# Patient Record
Sex: MALE | Race: OTHER | URBAN - METROPOLITAN AREA
[De-identification: names, ages, dates, MRNs, and addresses within clinical notes are randomized per-mention and may not be internally consistent; named-entity substitution may affect disease eponyms.]

---

## 2017-04-26 ENCOUNTER — EMERGENCY (EMERGENCY)
Facility: HOSPITAL | Age: 22
LOS: 1 days | Discharge: HOME | End: 2017-04-26
Attending: EMERGENCY MEDICINE | Admitting: EMERGENCY MEDICINE
Payer: MEDICAID

## 2017-04-26 VITALS
DIASTOLIC BLOOD PRESSURE: 85 MMHG | OXYGEN SATURATION: 99 % | WEIGHT: 195.11 LBS | SYSTOLIC BLOOD PRESSURE: 135 MMHG | HEART RATE: 73 BPM | HEIGHT: 71 IN | RESPIRATION RATE: 18 BRPM | TEMPERATURE: 98 F

## 2017-04-26 DIAGNOSIS — S50.11XA CONTUSION OF RIGHT FOREARM, INITIAL ENCOUNTER: ICD-10-CM

## 2017-04-26 DIAGNOSIS — M54.2 CERVICALGIA: ICD-10-CM

## 2017-04-26 DIAGNOSIS — S50.01XA CONTUSION OF RIGHT ELBOW, INITIAL ENCOUNTER: ICD-10-CM

## 2017-04-26 DIAGNOSIS — M79.601 PAIN IN RIGHT ARM: ICD-10-CM

## 2017-04-26 PROCEDURE — 73030 X-RAY EXAM OF SHOULDER: CPT | Mod: 26,RT

## 2017-04-26 PROCEDURE — 99284 EMERGENCY DEPT VISIT MOD MDM: CPT | Mod: 25

## 2017-04-27 VITALS
HEART RATE: 56 BPM | RESPIRATION RATE: 18 BRPM | SYSTOLIC BLOOD PRESSURE: 120 MMHG | OXYGEN SATURATION: 98 % | DIASTOLIC BLOOD PRESSURE: 60 MMHG | TEMPERATURE: 98 F

## 2017-04-27 PROCEDURE — 73070 X-RAY EXAM OF ELBOW: CPT | Mod: 26,RT

## 2017-04-27 PROCEDURE — 73030 X-RAY EXAM OF SHOULDER: CPT | Mod: 26,RT

## 2017-04-27 PROCEDURE — 73090 X-RAY EXAM OF FOREARM: CPT | Mod: 26,RT

## 2017-04-27 RX ORDER — IBUPROFEN 200 MG
1 TABLET ORAL
Qty: 28 | Refills: 0 | OUTPATIENT
Start: 2017-04-27 | End: 2017-05-04

## 2017-04-27 RX ORDER — IBUPROFEN 200 MG
600 TABLET ORAL ONCE
Qty: 0 | Refills: 0 | Status: COMPLETED | OUTPATIENT
Start: 2017-04-27 | End: 2017-04-27

## 2017-04-27 RX ORDER — OXYCODONE HYDROCHLORIDE 5 MG/1
1 TABLET ORAL
Qty: 16 | Refills: 0 | OUTPATIENT
Start: 2017-04-27 | End: 2017-05-01

## 2017-04-27 RX ORDER — CYCLOBENZAPRINE HYDROCHLORIDE 10 MG/1
1 TABLET, FILM COATED ORAL
Qty: 15 | Refills: 0 | OUTPATIENT
Start: 2017-04-27 | End: 2017-05-02

## 2017-04-27 RX ORDER — CYCLOBENZAPRINE HYDROCHLORIDE 10 MG/1
10 TABLET, FILM COATED ORAL ONCE
Qty: 0 | Refills: 0 | Status: COMPLETED | OUTPATIENT
Start: 2017-04-27 | End: 2017-04-27

## 2017-04-27 RX ADMIN — CYCLOBENZAPRINE HYDROCHLORIDE 10 MILLIGRAM(S): 10 TABLET, FILM COATED ORAL at 01:45

## 2017-04-27 RX ADMIN — Medication 600 MILLIGRAM(S): at 01:46
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
## 2017-04-27 NOTE — ED PROVIDER NOTE - CARE PLAN
Principal Discharge DX:	Contusion of right elbow, initial encounter  Secondary Diagnosis:	Contusion of right forearm, initial encounter

## 2017-04-27 NOTE — ED PROVIDER NOTE - LOCATION
shoulder/wlbow, forearm, with diffuse muscle ttp, and edema, no crepitus, distal pulses intact, with intact median/radial/ulnar nn to motor and sensory. no deformities noted.

## 2017-04-27 NOTE — ED PROVIDER NOTE - MUSCULOSKELETAL NECK EXAM
no deformity, pain or tenderness. no restriction of movement/no midline ttp or step offs. from. mild R paracervical ttp./trachea midline

## 2022-08-24 PROBLEM — Z00.00 ENCOUNTER FOR PREVENTIVE HEALTH EXAMINATION: Status: ACTIVE | Noted: 2022-08-24

## 2022-09-14 ENCOUNTER — NON-APPOINTMENT (OUTPATIENT)
Age: 27
End: 2022-09-14

## 2022-09-14 ENCOUNTER — APPOINTMENT (OUTPATIENT)
Dept: ORTHOPEDIC SURGERY | Facility: CLINIC | Age: 27
End: 2022-09-14

## 2022-09-14 VITALS
HEIGHT: 72 IN | BODY MASS INDEX: 27.77 KG/M2 | DIASTOLIC BLOOD PRESSURE: 81 MMHG | HEART RATE: 76 BPM | WEIGHT: 205 LBS | OXYGEN SATURATION: 96 % | SYSTOLIC BLOOD PRESSURE: 132 MMHG

## 2022-09-14 DIAGNOSIS — S62.309A UNSPECIFIED FRACTURE OF UNSPECIFIED METACARPAL BONE, INITIAL ENCOUNTER FOR CLOSED FRACTURE: ICD-10-CM

## 2022-09-14 PROCEDURE — 73130 X-RAY EXAM OF HAND: CPT | Mod: RT

## 2022-09-14 PROCEDURE — 99204 OFFICE O/P NEW MOD 45 MIN: CPT

## 2022-09-16 ENCOUNTER — APPOINTMENT (OUTPATIENT)
Dept: ORTHOPEDIC SURGERY | Facility: CLINIC | Age: 27
End: 2022-09-16

## 2022-09-16 VITALS
HEIGHT: 72 IN | WEIGHT: 205 LBS | BODY MASS INDEX: 27.77 KG/M2 | DIASTOLIC BLOOD PRESSURE: 79 MMHG | SYSTOLIC BLOOD PRESSURE: 123 MMHG | HEART RATE: 81 BPM

## 2022-09-16 DIAGNOSIS — S62.306A UNSPECIFIED FRACTURE OF FIFTH METACARPAL BONE, RIGHT HAND, INITIAL ENCOUNTER FOR CLOSED FRACTURE: ICD-10-CM

## 2022-09-16 DIAGNOSIS — M79.643 PAIN IN UNSPECIFIED HAND: ICD-10-CM

## 2022-09-16 PROCEDURE — 73130 X-RAY EXAM OF HAND: CPT | Mod: RT

## 2022-09-16 PROCEDURE — 99214 OFFICE O/P EST MOD 30 MIN: CPT

## 2022-09-16 NOTE — DISCUSSION/SUMMARY
[FreeTextEntry1] : He has findings consistent with at least a 1-1/2-month old right fifth metacarpal base fracture with impaction and ulnar subluxation of the fifth CMC joint.  The fracture has essentially healed.\par \par I had a discussion with the patient regarding today's visit, the prognosis of this diagnosis, and treatment recommendations and options.\par \par I did tell him that at this point in time, the fracture has essentially healed.  If he would have initially been seen, I would have recommended surgery.  However, at this point in time, to mobilize that fracture would likely lead to avascular necrosis.  I therefore recommended giving this time to see if his symptoms do eventually resolve.  Sometimes this does occur, as the fifth CMC joint is relatively immobile.  I did tell him that if his symptoms Do not eventually resolve, then I would consider 1/5 CMC joint fusion.\par \par He has agreed to the above plan of management and has expressed full understanding.  All questions were fully answered to their satisfaction. \par \par My cumulative time spent on this visit included: Preparation for the visit, review of the medical records, review of pertinent diagnostic studies, examination and counseling of the patient on the above diagnosis, treatment plan and prognosis, orders of diagnostic tests, medication and/or appropriate procedures and documentation in the medical records of today's visit.

## 2022-09-16 NOTE — END OF VISIT
[FreeTextEntry3] : This note was written by Shellie Bautista on 09/16/2022 acting solely as a scribe for Dr. Mathew Pal.\par  \par All medical record entries made by the Scribe were at my, Dr. Mathew Pal, direction and personally dictated by me on 09/16/2022. I have personally reviewed the chart and agree that the record accurately reflects my personal performance of the history, physical exam, assessment and plan.

## 2022-09-16 NOTE — ADDENDUM
[FreeTextEntry1] : I, Shellie Bautista, acted solely as a scribe for Dr. Pal on this date on 09/16/2022.

## 2022-09-16 NOTE — HISTORY OF PRESENT ILLNESS
[Right] : right hand dominant [FreeTextEntry1] : Patient is a 26 year old male who presents with complaints of right hand pain, localized dorsally at the base of the fifth metacarpal. He reports to have injured the hand about 1 1/2 month ago when he fell down stairs.  There is some question as to whether or not the fracture occurred 1-1/2 months ago versus 3 months ago.\par \par He was not initially seen but later presented for treatment to his PCP's office who sent him to xrays at [facility] and [facility]. He did not follow up with another physician after obtaining the xrays until two days ago on 9/14/22 at which time he was seen by Dr. Dea Saab. She obtained her own set of xray which revealed a fifth metacarpal fracture. She then referred him to me today for further treatment. Yara presents to the office with continued pain which is exacerbated with use and twisting/turning motions. He denies numbness and tingling. He rates his pain as an 8 out of 10.\par \par Of note, Dr. Saab documented that the initial injury had occurred in June however upon presentation to the office today, he stated the injury occurred only 1 month ago.